# Patient Record
Sex: FEMALE | Race: WHITE | HISPANIC OR LATINO | Employment: UNEMPLOYED | URBAN - METROPOLITAN AREA
[De-identification: names, ages, dates, MRNs, and addresses within clinical notes are randomized per-mention and may not be internally consistent; named-entity substitution may affect disease eponyms.]

---

## 2022-09-05 ENCOUNTER — APPOINTMENT (OUTPATIENT)
Dept: GENERAL RADIOLOGY | Age: 72
End: 2022-09-05
Attending: EMERGENCY MEDICINE

## 2022-09-05 ENCOUNTER — HOSPITAL ENCOUNTER (EMERGENCY)
Age: 72
Discharge: HOME | End: 2022-09-05
Attending: EMERGENCY MEDICINE

## 2022-09-05 VITALS
HEART RATE: 67 BPM | TEMPERATURE: 98.8 F | DIASTOLIC BLOOD PRESSURE: 82 MMHG | RESPIRATION RATE: 18 BRPM | OXYGEN SATURATION: 96 % | WEIGHT: 250 LBS | SYSTOLIC BLOOD PRESSURE: 139 MMHG | HEIGHT: 64 IN | BODY MASS INDEX: 42.68 KG/M2

## 2022-09-05 DIAGNOSIS — R73.9 HYPERGLYCEMIA: Primary | ICD-10-CM

## 2022-09-05 LAB
ALBUMIN SERPL-MCNC: 3.5 G/DL (ref 3.6–5.1)
ALBUMIN/GLOB SERPL: 0.9 {RATIO} (ref 1–2.4)
ALP SERPL-CCNC: 99 UNITS/L (ref 45–117)
ALT SERPL-CCNC: 16 UNITS/L
ANION GAP SERPL CALC-SCNC: 11 MMOL/L (ref 7–19)
APPEARANCE UR: CLEAR
AST SERPL-CCNC: 16 UNITS/L
ATRIAL RATE (BPM): 76
BACTERIA #/AREA URNS HPF: ABNORMAL /HPF
BASE EXCESS / DEFICIT, VENOUS - RESPIRATORY: 3 MMOL/L (ref -2–2)
BASOPHILS # BLD: 0.1 K/MCL (ref 0–0.3)
BASOPHILS NFR BLD: 1 %
BDY SITE: ABNORMAL
BILIRUB SERPL-MCNC: 0.2 MG/DL (ref 0.2–1)
BILIRUB UR QL STRIP: NEGATIVE
BODY TEMPERATURE: 36.9 DEGREES
BUN SERPL-MCNC: 7 MG/DL (ref 6–20)
BUN/CREAT SERPL: 12 (ref 7–25)
CA-I BLD-SCNC: 1.21 MMOL/L (ref 1.15–1.29)
CALCIUM SERPL-MCNC: 9.1 MG/DL (ref 8.4–10.2)
CHLORIDE SERPL-SCNC: 97 MMOL/L (ref 97–110)
CO2 SERPL-SCNC: 27 MMOL/L (ref 21–32)
COHGB MFR BLDV: 1 %
COLOR UR: YELLOW
CONDITION: ABNORMAL
CREAT SERPL-MCNC: 0.6 MG/DL (ref 0.51–0.95)
DEPRECATED RDW RBC: 42.2 FL (ref 39–50)
EOSINOPHIL # BLD: 0.1 K/MCL (ref 0–0.5)
EOSINOPHIL NFR BLD: 1 %
ERYTHROCYTE [DISTWIDTH] IN BLOOD: 14.8 % (ref 11–15)
FASTING DURATION TIME PATIENT: ABNORMAL H
GFR SERPLBLD BASED ON 1.73 SQ M-ARVRAT: >90 ML/MIN
GLOBULIN SER-MCNC: 3.8 G/DL (ref 2–4)
GLUCOSE BLDC GLUCOMTR-MCNC: 309 MG/DL (ref 70–99)
GLUCOSE BLDC GLUCOMTR-MCNC: 327 MG/DL (ref 70–99)
GLUCOSE BLDC GLUCOMTR-MCNC: 419 MG/DL (ref 70–99)
GLUCOSE BLDC GLUCOMTR-MCNC: 429 MG/DL (ref 70–99)
GLUCOSE SERPL-MCNC: 389 MG/DL (ref 70–99)
GLUCOSE UR STRIP-MCNC: >=500 MG/DL
HCO3 BLDV-SCNC: 28.9 MMOL/L (ref 22–28)
HCT VFR BLD CALC: 41.9 % (ref 36–46.5)
HGB BLD-MCNC: 13.2 G/DL (ref 12–15.5)
HGB BLD-MCNC: 13.6 G/DL (ref 12–15.5)
HGB UR QL STRIP: NEGATIVE
HYALINE CASTS #/AREA URNS LPF: ABNORMAL /LPF
IMM GRANULOCYTES # BLD AUTO: 0.1 K/MCL (ref 0–0.2)
IMM GRANULOCYTES # BLD: 1 %
KETONES UR STRIP-MCNC: NEGATIVE MG/DL
LACTATE BLDV-SCNC: 1.8 MMOL/L
LEUKOCYTE ESTERASE UR QL STRIP: NEGATIVE
LYMPHOCYTES # BLD: 2.9 K/MCL (ref 1–4)
LYMPHOCYTES NFR BLD: 23 %
MCH RBC QN AUTO: 24.9 PG (ref 26–34)
MCHC RBC AUTO-ENTMCNC: 31.5 G/DL (ref 32–36.5)
MCV RBC AUTO: 79.1 FL (ref 78–100)
METHGB MFR BLDMV: 0.3 %
MONOCYTES # BLD: 0.8 K/MCL (ref 0.3–0.9)
MONOCYTES NFR BLD: 7 %
NEUTROPHILS # BLD: 8.7 K/MCL (ref 1.8–7.7)
NEUTROPHILS NFR BLD: 67 %
NITRITE UR QL STRIP: NEGATIVE
NRBC BLD MANUAL-RTO: 0 /100 WBC
OXYHGB MFR BLDV: 27.2 % (ref 60–80)
P AXIS (DEGREES): 44
PCO2 BLDV: 50 MM HG (ref 38–51)
PH BLDV: 7.37 UNITS (ref 7.35–7.45)
PH UR STRIP: 6 [PH] (ref 5–7)
PLATELET # BLD AUTO: 378 K/MCL (ref 140–450)
PO2 BLDV: 21 MM HG (ref 35–42)
POTASSIUM BLD-SCNC: 4.2 MMOL/L (ref 3.4–5.1)
POTASSIUM SERPL-SCNC: 4.3 MMOL/L (ref 3.4–5.1)
PR-INTERVAL (MSEC): 148
PROT SERPL-MCNC: 7.3 G/DL (ref 6.4–8.2)
PROT UR STRIP-MCNC: NEGATIVE MG/DL
QRS-INTERVAL (MSEC): 88
QT-INTERVAL (MSEC): 380
QTC: 427
R AXIS (DEGREES): -62
RBC # BLD: 5.3 MIL/MCL (ref 4–5.2)
RBC #/AREA URNS HPF: ABNORMAL /HPF
REPORT TEXT: NORMAL
SAO2 DF BLDV: 28 % (ref 60–80)
SODIUM BLD-SCNC: 131 MMOL/L (ref 135–145)
SODIUM SERPL-SCNC: 131 MMOL/L (ref 135–145)
SP GR UR STRIP: 1.01 (ref 1–1.03)
SQUAMOUS #/AREA URNS HPF: ABNORMAL /HPF
T AXIS (DEGREES): 27
TROPONIN I SERPL DL<=0.01 NG/ML-MCNC: 7 NG/L
UROBILINOGEN UR STRIP-MCNC: 0.2 MG/DL
VENTRICULAR RATE EKG/MIN (BPM): 76
WBC # BLD: 12.7 K/MCL (ref 4.2–11)
WBC #/AREA URNS HPF: ABNORMAL /HPF

## 2022-09-05 PROCEDURE — 83050 HGB METHEMOGLOBIN QUAN: CPT

## 2022-09-05 PROCEDURE — 82962 GLUCOSE BLOOD TEST: CPT

## 2022-09-05 PROCEDURE — 85018 HEMOGLOBIN: CPT

## 2022-09-05 PROCEDURE — C9803 HOPD COVID-19 SPEC COLLECT: HCPCS

## 2022-09-05 PROCEDURE — 80053 COMPREHEN METABOLIC PANEL: CPT | Performed by: EMERGENCY MEDICINE

## 2022-09-05 PROCEDURE — 84295 ASSAY OF SERUM SODIUM: CPT

## 2022-09-05 PROCEDURE — 96360 HYDRATION IV INFUSION INIT: CPT

## 2022-09-05 PROCEDURE — 81001 URINALYSIS AUTO W/SCOPE: CPT | Performed by: EMERGENCY MEDICINE

## 2022-09-05 PROCEDURE — U0005 INFEC AGEN DETEC AMPLI PROBE: HCPCS | Performed by: EMERGENCY MEDICINE

## 2022-09-05 PROCEDURE — 99284 EMERGENCY DEPT VISIT MOD MDM: CPT | Performed by: EMERGENCY MEDICINE

## 2022-09-05 PROCEDURE — 10002807 HB RX 258: Performed by: EMERGENCY MEDICINE

## 2022-09-05 PROCEDURE — 84132 ASSAY OF SERUM POTASSIUM: CPT

## 2022-09-05 PROCEDURE — 71046 X-RAY EXAM CHEST 2 VIEWS: CPT

## 2022-09-05 PROCEDURE — 99285 EMERGENCY DEPT VISIT HI MDM: CPT

## 2022-09-05 PROCEDURE — 96361 HYDRATE IV INFUSION ADD-ON: CPT

## 2022-09-05 PROCEDURE — 82330 ASSAY OF CALCIUM: CPT

## 2022-09-05 PROCEDURE — 84484 ASSAY OF TROPONIN QUANT: CPT | Performed by: EMERGENCY MEDICINE

## 2022-09-05 PROCEDURE — 83605 ASSAY OF LACTIC ACID: CPT

## 2022-09-05 PROCEDURE — 93005 ELECTROCARDIOGRAM TRACING: CPT | Performed by: EMERGENCY MEDICINE

## 2022-09-05 PROCEDURE — 85025 COMPLETE CBC W/AUTO DIFF WBC: CPT | Performed by: EMERGENCY MEDICINE

## 2022-09-05 PROCEDURE — 10002800 HB RX 250 W HCPCS: Performed by: EMERGENCY MEDICINE

## 2022-09-05 RX ORDER — INSULIN LISPRO 100 [IU]/ML
5 INJECTION, SOLUTION INTRAVENOUS; SUBCUTANEOUS ONCE
Status: COMPLETED | OUTPATIENT
Start: 2022-09-05 | End: 2022-09-05

## 2022-09-05 RX ADMIN — INSULIN LISPRO 5 UNITS: 100 INJECTION, SOLUTION INTRAVENOUS; SUBCUTANEOUS at 21:24

## 2022-09-05 RX ADMIN — SODIUM CHLORIDE, POTASSIUM CHLORIDE, SODIUM LACTATE AND CALCIUM CHLORIDE 1000 ML: 600; 310; 30; 20 INJECTION, SOLUTION INTRAVENOUS at 19:08

## 2022-09-05 ASSESSMENT — HEART SCORE
EKG: NORMAL
RISK FACTORS: 1-2 RISK FACTORS
AGE: EQUAL OR GREATER THAN 65
HEART SCORE: 3
HISTORY: SLIGHTLY SUSPICIOUS
TROPONIN: EQUAL OR LESS THAN NORMAL LIMIT

## 2022-09-05 ASSESSMENT — PAIN SCALES - WONG BAKER: WONGBAKER_NUMERICALRESPONSE: 6

## 2022-09-06 LAB
RAINBOW EXTRA TUBES HOLD SPECIMEN: NORMAL
SARS-COV-2 RNA RESP QL NAA+PROBE: NOT DETECTED
SERVICE CMNT-IMP: NORMAL
SERVICE CMNT-IMP: NORMAL

## 2022-09-06 ASSESSMENT — ENCOUNTER SYMPTOMS
CHILLS: 0
BACK PAIN: 0
NUMBNESS: 0
HEADACHES: 0
VOMITING: 0
SHORTNESS OF BREATH: 0
NAUSEA: 0
EYE DISCHARGE: 0
COLOR CHANGE: 0
CONFUSION: 0
EYE PAIN: 0
APPETITE CHANGE: 0
WEAKNESS: 0
SORE THROAT: 0
FEVER: 0
ABDOMINAL PAIN: 0
BLOOD IN STOOL: 0
DIARRHEA: 0
COUGH: 0

## 2023-05-05 ENCOUNTER — OFFICE VISIT (OUTPATIENT)
Dept: FAMILY MEDICINE CLINIC | Facility: CLINIC | Age: 73
End: 2023-05-05

## 2023-05-05 VITALS
DIASTOLIC BLOOD PRESSURE: 71 MMHG | BODY MASS INDEX: 34.2 KG/M2 | SYSTOLIC BLOOD PRESSURE: 104 MMHG | OXYGEN SATURATION: 93 % | WEIGHT: 193 LBS | TEMPERATURE: 97 F | HEART RATE: 88 BPM | HEIGHT: 63 IN

## 2023-05-05 DIAGNOSIS — E11.65 UNCONTROLLED TYPE 2 DIABETES MELLITUS WITH HYPERGLYCEMIA (HCC): ICD-10-CM

## 2023-05-05 DIAGNOSIS — E16.2 HYPOGLYCEMIA: ICD-10-CM

## 2023-05-05 DIAGNOSIS — K62.89 RECTAL MASS: Primary | ICD-10-CM

## 2023-05-05 DIAGNOSIS — R53.1 WEAKNESS: ICD-10-CM

## 2023-05-05 DIAGNOSIS — I48.0 PAROXYSMAL A-FIB (HCC): ICD-10-CM

## 2023-05-05 PROBLEM — I50.9 CHRONIC CHF (HCC): Status: ACTIVE | Noted: 2023-04-14

## 2023-05-05 PROBLEM — K76.9 LIVER LESION: Status: ACTIVE | Noted: 2023-04-14

## 2023-05-05 PROBLEM — E78.5 HYPERLIPIDEMIA: Status: ACTIVE | Noted: 2023-04-28

## 2023-05-05 PROBLEM — C20 RECTAL CANCER METASTASIZED TO LIVER (HCC): Status: ACTIVE | Noted: 2023-04-15

## 2023-05-05 PROBLEM — I50.23 ACUTE ON CHRONIC SYSTOLIC (CONGESTIVE) HEART FAILURE (HCC): Status: ACTIVE | Noted: 2022-10-15

## 2023-05-05 PROBLEM — E11.21 MICROALBUMINURIC DIABETIC NEPHROPATHY (HCC): Status: ACTIVE | Noted: 2020-02-11

## 2023-05-05 PROBLEM — J96.01 ACUTE RESPIRATORY FAILURE WITH HYPOXIA (HCC): Status: ACTIVE | Noted: 2022-10-15

## 2023-05-05 PROBLEM — I48.91 ATRIAL FIBRILLATION STATUS POST CARDIOVERSION (HCC): Status: ACTIVE | Noted: 2022-10-15

## 2023-05-05 PROBLEM — C78.7 RECTAL CANCER METASTASIZED TO LIVER (HCC): Status: ACTIVE | Noted: 2023-04-15

## 2023-05-05 PROBLEM — J45.909 ASTHMA: Status: ACTIVE | Noted: 2022-10-15

## 2023-05-05 LAB
CREAT UR-SCNC: 90.3 MG/DL
MICROALBUMIN UR-MCNC: 8.28 MG/DL
MICROALBUMIN/CREAT 24H UR-RTO: 91.7 UG/MG (ref ?–30)

## 2023-05-05 PROCEDURE — 3008F BODY MASS INDEX DOCD: CPT

## 2023-05-05 PROCEDURE — 3061F NEG MICROALBUMINURIA REV: CPT

## 2023-05-05 PROCEDURE — 3060F POS MICROALBUMINURIA REV: CPT

## 2023-05-05 PROCEDURE — 3078F DIAST BP <80 MM HG: CPT

## 2023-05-05 PROCEDURE — 3074F SYST BP LT 130 MM HG: CPT

## 2023-05-05 PROCEDURE — 99204 OFFICE O/P NEW MOD 45 MIN: CPT

## 2023-05-05 RX ORDER — PEN NEEDLE, DIABETIC 32GX 5/32"
NEEDLE, DISPOSABLE MISCELLANEOUS
COMMUNITY
Start: 2023-04-17

## 2023-05-05 RX ORDER — BLOOD-GLUCOSE METER
EACH MISCELLANEOUS
COMMUNITY
Start: 2023-04-17

## 2023-05-05 RX ORDER — LANCETS 30 GAUGE
EACH MISCELLANEOUS 3 TIMES DAILY
COMMUNITY
Start: 2023-04-28

## 2023-05-05 RX ORDER — RIVAROXABAN 20 MG/1
20 TABLET, FILM COATED ORAL
COMMUNITY
Start: 2023-04-24

## 2023-05-05 RX ORDER — ROSUVASTATIN CALCIUM 20 MG/1
20 TABLET, COATED ORAL NIGHTLY
COMMUNITY
Start: 2022-10-11

## 2023-05-05 RX ORDER — PANTOPRAZOLE SODIUM 40 MG/1
1 TABLET, DELAYED RELEASE ORAL EVERY MORNING
COMMUNITY
Start: 2023-04-17

## 2023-05-05 RX ORDER — PROCHLORPERAZINE 25 MG/1
1 SUPPOSITORY RECTAL AS DIRECTED
COMMUNITY
Start: 2023-02-21

## 2023-05-05 RX ORDER — HYDROCORTISONE ACETATE 25 MG/1
25 SUPPOSITORY RECTAL
COMMUNITY
Start: 2023-04-18

## 2023-05-05 RX ORDER — QUETIAPINE FUMARATE 50 MG/1
TABLET, FILM COATED ORAL
COMMUNITY
Start: 2023-04-17

## 2023-05-05 RX ORDER — HYDROCODONE BITARTRATE AND ACETAMINOPHEN 5; 325 MG/1; MG/1
1 TABLET ORAL EVERY 6 HOURS PRN
COMMUNITY
Start: 2023-04-19

## 2023-05-05 RX ORDER — SACUBITRIL AND VALSARTAN 24; 26 MG/1; MG/1
TABLET, FILM COATED ORAL
COMMUNITY
Start: 2023-05-03

## 2023-05-05 RX ORDER — PROCHLORPERAZINE 25 MG/1
1 SUPPOSITORY RECTAL
COMMUNITY
Start: 2022-12-22

## 2023-05-05 RX ORDER — ALPRAZOLAM 1 MG/1
TABLET ORAL
COMMUNITY
Start: 2023-04-06

## 2023-05-05 RX ORDER — ALBUTEROL SULFATE 90 UG/1
2 AEROSOL, METERED RESPIRATORY (INHALATION) 4 TIMES DAILY PRN
COMMUNITY
Start: 2023-02-16

## 2023-05-05 RX ORDER — METOPROLOL SUCCINATE 50 MG/1
TABLET, EXTENDED RELEASE ORAL
COMMUNITY
Start: 2023-04-06

## 2023-05-05 RX ORDER — SPIRONOLACTONE 25 MG/1
TABLET ORAL
COMMUNITY
Start: 2023-04-17

## 2023-05-05 RX ORDER — BUDESONIDE AND FORMOTEROL FUMARATE DIHYDRATE 160; 4.5 UG/1; UG/1
2 AEROSOL RESPIRATORY (INHALATION) 2 TIMES DAILY
COMMUNITY

## 2023-05-05 RX ORDER — ESCITALOPRAM OXALATE 20 MG/1
TABLET ORAL
COMMUNITY
Start: 2023-04-17

## 2023-05-05 RX ORDER — SENNOSIDES 8.6 MG
1 CAPSULE ORAL 3 TIMES DAILY
COMMUNITY
Start: 2022-10-11

## 2023-05-05 RX ORDER — INSULIN GLARGINE 100 [IU]/ML
20 INJECTION, SOLUTION SUBCUTANEOUS DAILY
COMMUNITY
Start: 2023-04-28

## 2023-05-05 RX ORDER — L. ACIDOPHILUS/BIFIDO. LONGUM 15 MG
CAPSULE,DELAYED RELEASE (ENTERIC COATED) ORAL 3 TIMES DAILY
COMMUNITY
Start: 2023-04-28

## 2023-05-05 RX ORDER — ALBUTEROL SULFATE 90 UG/1
2 AEROSOL, METERED RESPIRATORY (INHALATION) EVERY 6 HOURS PRN
COMMUNITY
Start: 2022-10-05

## 2023-05-06 ENCOUNTER — PATIENT MESSAGE (OUTPATIENT)
Dept: FAMILY MEDICINE CLINIC | Facility: CLINIC | Age: 73
End: 2023-05-06

## 2023-05-17 ENCOUNTER — TELEPHONE (OUTPATIENT)
Dept: FAMILY MEDICINE CLINIC | Facility: CLINIC | Age: 73
End: 2023-05-17

## 2023-05-17 DIAGNOSIS — R53.1 WEAKNESS: Primary | ICD-10-CM

## 2023-05-17 DIAGNOSIS — I48.0 PAROXYSMAL A-FIB (HCC): ICD-10-CM

## 2023-05-17 DIAGNOSIS — K62.89 RECTAL MASS: ICD-10-CM

## 2023-05-17 DIAGNOSIS — E16.2 HYPOGLYCEMIA: ICD-10-CM

## 2023-05-17 NOTE — TELEPHONE ENCOUNTER
Shay Mora from [place] called to get orders for patient for home health.  Patient was recommended home health orders from hospital stay and [facility] in network is [facility] and they needs orders for patient    Fax: [phone]  Office phone number: [phone]

## 2023-05-30 ENCOUNTER — TELEPHONE (OUTPATIENT)
Dept: FAMILY MEDICINE CLINIC | Facility: CLINIC | Age: 73
End: 2023-05-30

## 2023-05-30 NOTE — TELEPHONE ENCOUNTER
Message given to me by Dr Newberry Parents to please respond to page , Dr Newberry Parents received a page from the daughter of Susana Thomas, last seen by Caryle Lusty. I called the daughter Marcha Mortimer at [phone]. The daughter stated that her mother was taken to [facility] for low oxygen\"88\" daughter stated she has been having severe bleeding and she is not mobile anymore. She is to be released tomorrow from [facility] and daughter stated hospital is stating that PCP needs to order a cathether and oxygen machine and supplies that she will need at home. Advised daughter that message would be sent to Caryle Lusty and she will be in office tomorrow 05/31/23. Daughter verbalized understanding.

## 2023-05-31 NOTE — TELEPHONE ENCOUNTER
Called patient's daughter. Confirmed patient's name and . Daughter states that they do not have all the supplies they will need for when her mom is discharged from the hospital.  They do have home health services set up but daughter states her mom's needs are now completely different. She can't walk to go the bathroom, they can't lift her, etc. They have appointment with palliative care in 2 weeks. Doctors in hospital have recommended physical therapy over hospice. Advised daughter to inquire about a  at the hospital that can help her organize services. Advised daughter that hospital can arrange the services she will need when discharged. Advised patient to call back with any further concerns.
